# Patient Record
Sex: FEMALE | URBAN - METROPOLITAN AREA
[De-identification: names, ages, dates, MRNs, and addresses within clinical notes are randomized per-mention and may not be internally consistent; named-entity substitution may affect disease eponyms.]

---

## 2022-03-21 ENCOUNTER — APPOINTMENT (RX ONLY)
Dept: URBAN - METROPOLITAN AREA CLINIC 341 | Facility: CLINIC | Age: 82
Setting detail: DERMATOLOGY
End: 2022-03-21

## 2022-03-21 DIAGNOSIS — L57.0 ACTINIC KERATOSIS: ICD-10-CM | Status: INADEQUATELY CONTROLLED

## 2022-03-21 PROCEDURE — 17000 DESTRUCT PREMALG LESION: CPT

## 2022-03-21 PROCEDURE — ? LIQUID NITROGEN

## 2022-03-21 PROCEDURE — ? COUNSELING

## 2022-03-21 ASSESSMENT — LOCATION ZONE DERM: LOCATION ZONE: FACE

## 2022-03-21 ASSESSMENT — LOCATION DETAILED DESCRIPTION DERM: LOCATION DETAILED: LEFT MEDIAL FOREHEAD

## 2022-03-21 ASSESSMENT — LOCATION SIMPLE DESCRIPTION DERM: LOCATION SIMPLE: LEFT FOREHEAD

## 2022-03-21 NOTE — PROCEDURE: LIQUID NITROGEN
Detail Level: Detailed
Show Applicator Variable?: Yes
Consent: The patient's consent was obtained including but not limited to risks of crusting, scabbing, blistering, scarring, darker or lighter pigmentary change, recurrence, incomplete removal and infection.
Post-Care Instructions: I reviewed with the patient in detail post-care instructions. Patient is to wear sunprotection, and avoid picking at any of the treated lesions. Pt may apply Vaseline to crusted or scabbing areas.
Render Post-Care Instructions In Note?: no
Duration Of Freeze Thaw-Cycle (Seconds): 0

## 2022-03-21 NOTE — HPI: SKIN LESION
How Severe Is Your Skin Lesion?: mild
Has Your Skin Lesion Been Treated?: been treated
Is This A New Presentation, Or A Follow-Up?: Skin Lesion
When Was It Treated?: 07/01/2021
Which Family Member (Optional)?: Father

## 2022-04-18 ENCOUNTER — APPOINTMENT (RX ONLY)
Dept: URBAN - METROPOLITAN AREA CLINIC 341 | Facility: CLINIC | Age: 82
Setting detail: DERMATOLOGY
End: 2022-04-18

## 2022-04-18 DIAGNOSIS — L57.0 ACTINIC KERATOSIS: ICD-10-CM | Status: WELL CONTROLLED

## 2022-04-18 PROCEDURE — 99213 OFFICE O/P EST LOW 20 MIN: CPT

## 2022-04-18 PROCEDURE — ? COUNSELING

## 2022-04-18 PROCEDURE — ? TREATMENT REGIMEN

## 2022-04-18 ASSESSMENT — LOCATION ZONE DERM: LOCATION ZONE: FACE

## 2022-04-18 ASSESSMENT — LOCATION SIMPLE DESCRIPTION DERM: LOCATION SIMPLE: LEFT FOREHEAD

## 2022-04-18 ASSESSMENT — LOCATION DETAILED DESCRIPTION DERM: LOCATION DETAILED: LEFT MEDIAL FOREHEAD

## 2022-05-18 ENCOUNTER — APPOINTMENT (RX ONLY)
Dept: URBAN - METROPOLITAN AREA CLINIC 341 | Facility: CLINIC | Age: 82
Setting detail: DERMATOLOGY
End: 2022-05-18

## 2022-05-18 DIAGNOSIS — L57.0 ACTINIC KERATOSIS: ICD-10-CM | Status: RESOLVING

## 2022-05-18 PROCEDURE — 99212 OFFICE O/P EST SF 10 MIN: CPT

## 2022-05-18 PROCEDURE — ? OTHER

## 2022-05-18 PROCEDURE — ? COUNSELING

## 2022-05-18 ASSESSMENT — LOCATION ZONE DERM: LOCATION ZONE: FACE

## 2022-05-18 ASSESSMENT — LOCATION SIMPLE DESCRIPTION DERM: LOCATION SIMPLE: LEFT FOREHEAD

## 2022-05-18 ASSESSMENT — LOCATION DETAILED DESCRIPTION DERM: LOCATION DETAILED: LEFT MEDIAL FOREHEAD

## 2022-05-18 NOTE — PROCEDURE: OTHER
Render Risk Assessment In Note?: no
Note Text (......Xxx Chief Complaint.): This diagnosis correlates with the
Other (Free Text): Patient will follow up for biopsy if lesion returns. Currently well healed with very mild erythema. No scale or ulcers are noted on exam .
Detail Level: Zone

## 2023-05-26 ENCOUNTER — APPOINTMENT (RX ONLY)
Dept: URBAN - METROPOLITAN AREA CLINIC 341 | Facility: CLINIC | Age: 83
Setting detail: DERMATOLOGY
End: 2023-05-26

## 2023-05-26 DIAGNOSIS — D22 MELANOCYTIC NEVI: ICD-10-CM

## 2023-05-26 DIAGNOSIS — L81.4 OTHER MELANIN HYPERPIGMENTATION: ICD-10-CM

## 2023-05-26 DIAGNOSIS — L82.1 OTHER SEBORRHEIC KERATOSIS: ICD-10-CM

## 2023-05-26 DIAGNOSIS — D485 NEOPLASM OF UNCERTAIN BEHAVIOR OF SKIN: ICD-10-CM

## 2023-05-26 DIAGNOSIS — D18.0 HEMANGIOMA: ICD-10-CM

## 2023-05-26 DIAGNOSIS — Z85.828 PERSONAL HISTORY OF OTHER MALIGNANT NEOPLASM OF SKIN: ICD-10-CM

## 2023-05-26 DIAGNOSIS — L57.0 ACTINIC KERATOSIS: ICD-10-CM | Status: INADEQUATELY CONTROLLED

## 2023-05-26 PROBLEM — D18.01 HEMANGIOMA OF SKIN AND SUBCUTANEOUS TISSUE: Status: ACTIVE | Noted: 2023-05-26

## 2023-05-26 PROBLEM — D48.5 NEOPLASM OF UNCERTAIN BEHAVIOR OF SKIN: Status: ACTIVE | Noted: 2023-05-26

## 2023-05-26 PROBLEM — D22.5 MELANOCYTIC NEVI OF TRUNK: Status: ACTIVE | Noted: 2023-05-26

## 2023-05-26 PROCEDURE — ? SUNSCREEN RECOMMENDATIONS

## 2023-05-26 PROCEDURE — 99213 OFFICE O/P EST LOW 20 MIN: CPT | Mod: 25

## 2023-05-26 PROCEDURE — ? COUNSELING

## 2023-05-26 PROCEDURE — ? BIOPSY BY SHAVE METHOD

## 2023-05-26 PROCEDURE — ? OTHER

## 2023-05-26 PROCEDURE — ? PRESCRIPTION MEDICATION MANAGEMENT

## 2023-05-26 PROCEDURE — 11102 TANGNTL BX SKIN SINGLE LES: CPT

## 2023-05-26 PROCEDURE — ? PRESCRIPTION

## 2023-05-26 RX ORDER — FLUOROURACIL 5 MG/G
CREAM TOPICAL
Qty: 40 | Refills: 0 | Status: ERX | COMMUNITY
Start: 2023-05-26

## 2023-05-26 RX ADMIN — FLUOROURACIL: 5 CREAM TOPICAL at 00:00

## 2023-05-26 ASSESSMENT — LOCATION SIMPLE DESCRIPTION DERM
LOCATION SIMPLE: LEFT FOREHEAD
LOCATION SIMPLE: UPPER BACK
LOCATION SIMPLE: LEFT UPPER BACK
LOCATION SIMPLE: RIGHT UPPER ARM
LOCATION SIMPLE: RIGHT UPPER BACK

## 2023-05-26 ASSESSMENT — LOCATION DETAILED DESCRIPTION DERM
LOCATION DETAILED: RIGHT ANTERIOR PROXIMAL UPPER ARM
LOCATION DETAILED: INFERIOR THORACIC SPINE
LOCATION DETAILED: LEFT SUPERIOR MEDIAL UPPER BACK
LOCATION DETAILED: RIGHT MID-UPPER BACK
LOCATION DETAILED: LEFT MEDIAL FOREHEAD
LOCATION DETAILED: LEFT INFERIOR MEDIAL UPPER BACK

## 2023-05-26 ASSESSMENT — LOCATION ZONE DERM
LOCATION ZONE: FACE
LOCATION ZONE: TRUNK
LOCATION ZONE: ARM

## 2023-05-26 NOTE — PROCEDURE: OTHER
Render Risk Assessment In Note?: no
Detail Level: Zone
Note Text (......Xxx Chief Complaint.): This diagnosis correlates with the
Other (Free Text): Pt reports

## 2023-05-26 NOTE — PROCEDURE: PRESCRIPTION MEDICATION MANAGEMENT
Initiate Treatment: fluorouracil 5 % topical cream - Apply a pea-sized amount twice daily to the affected areas on the scalp for 2 weeks or until area becomes red and crusty
Render In Strict Bullet Format?: No
Detail Level: Zone

## 2023-06-01 ENCOUNTER — RX ONLY (OUTPATIENT)
Age: 83
Setting detail: RX ONLY
End: 2023-06-01

## 2023-06-01 RX ORDER — FLUOROURACIL 5 MG/G
CREAM TOPICAL
Qty: 40 | Refills: 0 | Status: ERX

## 2023-06-26 ENCOUNTER — APPOINTMENT (RX ONLY)
Dept: URBAN - METROPOLITAN AREA CLINIC 341 | Facility: CLINIC | Age: 83
Setting detail: DERMATOLOGY
End: 2023-06-26

## 2023-06-26 DIAGNOSIS — L57.0 ACTINIC KERATOSIS: ICD-10-CM

## 2023-06-26 DIAGNOSIS — L43.8 OTHER LICHEN PLANUS: ICD-10-CM | Status: STABLE

## 2023-06-26 PROCEDURE — ? COUNSELING

## 2023-06-26 PROCEDURE — 99213 OFFICE O/P EST LOW 20 MIN: CPT

## 2023-06-26 PROCEDURE — ? PRESCRIPTION MEDICATION MANAGEMENT

## 2023-06-26 PROCEDURE — ? PATHOLOGY DISCUSSION

## 2023-06-26 ASSESSMENT — LOCATION SIMPLE DESCRIPTION DERM
LOCATION SIMPLE: RIGHT POSTERIOR UPPER ARM
LOCATION SIMPLE: LEFT FOREHEAD

## 2023-06-26 ASSESSMENT — LOCATION DETAILED DESCRIPTION DERM
LOCATION DETAILED: RIGHT PROXIMAL POSTERIOR UPPER ARM
LOCATION DETAILED: LEFT MEDIAL FOREHEAD

## 2023-06-26 ASSESSMENT — LOCATION ZONE DERM
LOCATION ZONE: FACE
LOCATION ZONE: ARM

## 2023-06-26 NOTE — PROCEDURE: PRESCRIPTION MEDICATION MANAGEMENT
Render In Strict Bullet Format?: No
Continue Regimen: fluorouracil 5 % topical cream - Apply a pea-sized amount twice daily to the affected areas on the scalp for 2 weeks or until area becomes red and crusty
Plan: Pt can use Aquaphor to hydrate the area
Detail Level: Zone